# Patient Record
Sex: FEMALE | Race: WHITE | NOT HISPANIC OR LATINO | Employment: FULL TIME | URBAN - METROPOLITAN AREA
[De-identification: names, ages, dates, MRNs, and addresses within clinical notes are randomized per-mention and may not be internally consistent; named-entity substitution may affect disease eponyms.]

---

## 2017-07-25 ENCOUNTER — OFFICE VISIT (OUTPATIENT)
Dept: FAMILY MEDICINE | Facility: CLINIC | Age: 22
End: 2017-07-25
Payer: COMMERCIAL

## 2017-07-25 VITALS
HEIGHT: 68 IN | TEMPERATURE: 98 F | HEART RATE: 72 BPM | SYSTOLIC BLOOD PRESSURE: 134 MMHG | BODY MASS INDEX: 25.7 KG/M2 | OXYGEN SATURATION: 99 % | RESPIRATION RATE: 18 BRPM | DIASTOLIC BLOOD PRESSURE: 72 MMHG | WEIGHT: 169.56 LBS

## 2017-07-25 DIAGNOSIS — F41.0 PANIC: ICD-10-CM

## 2017-07-25 DIAGNOSIS — R19.7 DIARRHEA, UNSPECIFIED TYPE: ICD-10-CM

## 2017-07-25 DIAGNOSIS — R07.89 TIGHTNESS IN CHEST: ICD-10-CM

## 2017-07-25 DIAGNOSIS — F41.9 ANXIETY: Primary | ICD-10-CM

## 2017-07-25 DIAGNOSIS — N92.6 LATE MENSES: ICD-10-CM

## 2017-07-25 LAB
B-HCG UR QL: NEGATIVE
CTP QC/QA: YES

## 2017-07-25 PROCEDURE — 93010 ELECTROCARDIOGRAM REPORT: CPT | Mod: S$GLB,,, | Performed by: INTERNAL MEDICINE

## 2017-07-25 PROCEDURE — 99203 OFFICE O/P NEW LOW 30 MIN: CPT | Mod: S$GLB,,, | Performed by: FAMILY MEDICINE

## 2017-07-25 PROCEDURE — 81025 URINE PREGNANCY TEST: CPT | Mod: QW,S$GLB,, | Performed by: FAMILY MEDICINE

## 2017-07-25 PROCEDURE — 99999 PR PBB SHADOW E&M-EST. PATIENT-LVL IV: CPT | Mod: PBBFAC,,, | Performed by: FAMILY MEDICINE

## 2017-07-25 PROCEDURE — 93005 ELECTROCARDIOGRAM TRACING: CPT | Mod: S$GLB,,, | Performed by: FAMILY MEDICINE

## 2017-07-25 RX ORDER — SERTRALINE HYDROCHLORIDE 50 MG/1
50 TABLET, FILM COATED ORAL DAILY
Qty: 30 TABLET | Refills: 11 | Status: SHIPPED | OUTPATIENT
Start: 2017-07-25 | End: 2017-08-01

## 2017-07-25 NOTE — PROGRESS NOTES
Subjective:      Patient ID: Nadya Rosa is a 22 y.o. female.    Chief Complaint: Chest Pain      No past medical history on file.  No past surgical history on file.  No family history on file.  Social History     Social History    Marital status: Single     Spouse name: N/A    Number of children: N/A    Years of education: N/A     Occupational History    Not on file.     Social History Main Topics    Smoking status: Never Smoker    Smokeless tobacco: Never Used    Alcohol use Yes      Comment: rare    Drug use: Unknown    Sexual activity: Not on file     Other Topics Concern    Not on file     Social History Narrative    No narrative on file       Current Outpatient Prescriptions:     sertraline (ZOLOFT) 50 MG tablet, Take 1 tablet (50 mg total) by mouth once daily., Disp: 30 tablet, Rfl: 11  Review of patient's allergies indicates:  No Known Allergies    Review of Systems   Constitutional: Positive for activity change. Negative for unexpected weight change.   HENT: Negative for hearing loss, rhinorrhea and trouble swallowing.    Eyes: Negative for discharge and visual disturbance.   Respiratory: Positive for chest tightness. Negative for wheezing.    Cardiovascular: Positive for palpitations. Negative for chest pain.   Gastrointestinal: Positive for diarrhea. Negative for blood in stool, constipation and vomiting.   Endocrine: Negative for polydipsia and polyuria.   Genitourinary: Negative for difficulty urinating, dysuria, hematuria and menstrual problem.   Musculoskeletal: Negative for arthralgias, joint swelling and neck pain.   Neurological: Positive for headaches. Negative for weakness.   Psychiatric/Behavioral: Negative for confusion and dysphoric mood.     Chest Pain    Associated symptoms include headaches and palpitations. Pertinent negatives include no vomiting or weakness.     H/o anxiety for the past month.  Associated with intermittent headaches, chest tightness, tinglinig in hands  "and feet.  She is getting  in December.  Sister and her with strained relationship and sister moved back 3 months ago - trying to start a relationship.  Gatroenteritis last Monday and Tuesday resolved.  Sunday she was at movie with friends and felt horrible, bout of diarrhea and malaise.  Had to go home from movie theater.  She is becoming scared to be alone and having social withdrawal.  Chest tightness and sob comes and goes associated with stress and mild.  Headache resolved at this time.        Objective:   /72   Pulse 72   Temp 98.4 °F (36.9 °C) (Tympanic)   Resp 18   Ht 5' 7.5" (1.715 m)   Wt 76.9 kg (169 lb 8.5 oz)   LMP 06/22/2017   SpO2 99%   Breastfeeding? Unknown   BMI 26.16 kg/m²      Physical Exam   Constitutional: She is oriented to person, place, and time. She is cooperative. No distress.   Eyes: Conjunctivae and EOM are normal.   Cardiovascular: Normal rate, regular rhythm and normal heart sounds.    Pulmonary/Chest: Effort normal and breath sounds normal.   Musculoskeletal: She exhibits no edema.   Neurological: She is alert and oriented to person, place, and time. No cranial nerve deficit. Coordination and gait normal.   Skin: Skin is warm, dry and intact. No rash noted. She is not diaphoretic. No erythema.   Psychiatric: Her speech is normal and behavior is normal. Her mood appears anxious.   Nursing note and vitals reviewed.          Assessment:       1. Anxiety    2. Panic    3. Diarrhea, unspecified type    4. Tightness in chest            Plan:         Anxiety  Comments:  Start sertraline.  Side effects discussed.  Call if any problems.  Recheck in one month or sooner if any problems.  Orders:  -     CBC auto differential; Future; Expected date: 07/25/2017  -     Comprehensive metabolic panel; Future; Expected date: 07/25/2017  -     Vitamin B12; Future; Expected date: 07/25/2017  -     TSH; Future; Expected date: 07/25/2017  -     Magnesium; Future; Expected date: " 07/25/2017    Panic    Diarrhea, unspecified type  Comments:  Resolved.  Gastroenteritis Monday and Tuesday last week and diarrhea on Sunday.  Resolved at this time.    Tightness in chest  -     IN OFFICE EKG 12-LEAD (to [place])    Other orders  -     sertraline (ZOLOFT) 50 MG tablet; Take 1 tablet (50 mg total) by mouth once daily.  Dispense: 30 tablet; Refill: 11    UPT negative.    Patient Care Team:  Valerie Garza MD as PCP - General (Family Medicine)

## 2017-07-25 NOTE — PATIENT INSTRUCTIONS
Anxiety Reaction  Anxiety is the feeling we all get when we think something bad might happen. It is a normal response to stress and usually causes only a mild reaction. When anxiety becomes more severe, it can interfere with daily life. In some cases, you may not even be aware of what it is youre anxious about. There may also be a genetic link or it may be a learned behavior in the home.  Both psychological and physical triggers cause stress reaction. It's often a response to fear or emotional stress, real or imagined. This stress may come from home, family, work, or social relationships.  During an anxiety reaction, you may feel:  · Helpless  · Nervous  · Depressed  · Irritable  Your body may show signs of anxiety in many ways. You may experience:  · Dry mouth  · Shakiness  · Dizziness  · Weakness  · Trouble breathing  · Breathing fast (hyperventilating)  · Chest pressure  · Sweating  · Headache  · Nausea  · Diarrhea  · Tiredness  · Inability to sleep  · Sexual problems  Home care  · Try to locate the sources of stress in your life. They may not be obvious. These may include:  ¨ Daily hassles of life (traffic jams, missed appointments, car troubles, etc.)  ¨ Major life changes, both good (new baby, job promotion) and bad (loss of job, loss of loved one)  ¨ Overload: feeling that you have too many responsibilities and can't take care of all of them at once  ¨ Feeling helpless, feeling that your problems are beyond what youre able to solve  · Notice how your body reacts to stress. Learn to listen to your body signals. This will help you take action before the stress becomes severe.  · When you can, do something about the source of your stress. (Avoid hassles, limit the amount of change that happens in your life at one time and take a break when you feel overloaded).  · Unfortunately, many stressful situations can't be avoided. It is necessary to learn how to better manage stress. There are many proven methods  that will reduce your anxiety. These include simple things like exercise, good nutrition and adequate rest. Also, there are certain techniques that are helpful:  ¨ Relaxation  ¨ Breathing exercises  ¨ Visualization  ¨ Biofeedback  ¨ Meditation  For more information about this, consult your doctor or go to a local bookstore and review the many books and tapes available on this subject.  Follow-up care  If you feel that your anxiety is not responding to self-help measures, contact your doctor or make an appointment with a counselor. You may need short-term psychological counseling and temporary medicine to help you manage stress.  Call 911  Call your healthcare provider right away if any of these occur:  · Trouble breathing  · Confusion  · Drowsiness or trouble wakening  · Fainting or loss of consciousness  · Rapid heart rate  · Seizure  · New chest pain that becomes more severe, lasts longer, or spreads into your shoulder, arm, neck, jaw, or back  When to seek medical advice  Call your healthcare provider right away if any of these occur:  · Your symptoms get worse  · Severe headache not relieved by rest and mild pain reliever  Date Last Reviewed: 9/29/2015  © 7162-8421 [employer] [address] [place] [address]. All rights reserved. This information is not intended as a substitute for professional medical care. Always follow your healthcare professional's instructions.        Panic Attack  A panic attack is an extreme fear reaction that comes on for no clear reason. There is often a fear that something terrible will happen or that you may die. The attack may last a few minutes up to a few hours. Between attacks, things will seem quite normal. This condition has a psychological cause and can be treated with the help of a therapist or psychiatrist. Medicine can be very helpful for this problem.  Panic attacks usually come on suddenly, reaches a peak within minutes, and includes at least 4 of  these symptoms:  · Palpitations, pounding heart, or accelerated heart rate  · Sweating  · Crying  · Trembling or shaking  · Sensations of shortness of breath or smothering  · Feelings of choking  · Chest pain or discomfort  · Nausea or abdominal distress  · Feeling dizzy, unsteady, light-headed, or faint  · Numbness or tingling sensations  · Fear of dying  · Fear of going crazy or of losing control  · Feelings of unreality, strangeness, or detachment from the environment  Many of these symptoms can be linked to physical problems, so it is sometimes necessary to rule out conditions like thyroid disorders, heart disease, gastrointestinal problems, and others. They can also start as physical symptoms, but psychologically we may react to them in a fearful way, worsening the way we react and feel.  Home care  · Try to find the sources of stress in your life. They may not be obvious. These may include:  ¨ Daily hassles of life which pile up (traffic jams, missed appointments, car troubles, etc.).  ¨ Major life changes, both good (new baby, job promotion) and bad (loss of job, loss of loved one).  ¨ Feeling that you have too many responsibilities and can't take care of everything at once.  ¨ Helplessness: feeling like your problems are too much for you to handle.  · Notice how your body reacts to stress. Learn to listen to your body signals so that you can take action before the stress becomes severe.  · Try to be aware of what you were doing before the reaction started; this may give you clues to things that can trigger a reaction. It may be situations in your life, or what you were doing at the time.  · When possible, avoid or reduce the cause of stress. Avoid hassles, limit the amount of change that is happening in your life at one time or take a break when you feel overloaded.  · Unfortunately, many stressful situations cannot be avoided. Therefore, it is necessary to learn how to manage stress better. There are many  proven methods that work and will reduce your anxiety. These include simple things like exercise, good nutrition and adequate rest. Also, there are certain techniques that are helpful: relaxation and breathing exercises, visualization, biofeedback, meditation or simply taking some time-out to clear your mind. For more information about this, ask your doctor or go to a local bookstore and review the many books and tapes available on this subject.  Follow-up care  Follow-up with your healthcare provider, or as advised.  Call 911  Call 911 if any of these occur:  · Trouble breathing  · Confusion  · Very drowsy or trouble awakening  · Fainting or loss of consciousness  · Rapid heart rate  · Seizure  · New chest pain that becomes more severe, lasts longer, or spreads into your shoulder, arm, neck, jaw or back  When to seek medical advice  Call your healthcare provider right away if any of these occur:  · Worsening of your symptoms to the point of feeling out-of-control  · Increased pain with breathing  · Increasing feeling of weakness or dizziness  · Cough with dark colored sputum (phlegm) or blood  · Fever 1 degree above normal temperature ) lasting 24 to 48 hours or what your healthcare provider has advised  · Swelling, pain or redness in one leg  · Requests by family or friends for you to seek help for your symptoms  Date Last Reviewed: 9/29/2015 © 2000-2016 [employer]. [address] [place] [address], [place] [address]. All rights reserved. This information is not intended as a substitute for professional medical care. Always follow your healthcare professional's instructions.

## 2017-07-26 ENCOUNTER — TELEPHONE (OUTPATIENT)
Dept: FAMILY MEDICINE | Facility: CLINIC | Age: 22
End: 2017-07-26

## 2017-07-26 DIAGNOSIS — R19.7 DIARRHEA, UNSPECIFIED TYPE: ICD-10-CM

## 2017-07-26 NOTE — TELEPHONE ENCOUNTER
----- Message from Kira Castelan sent at 7/26/2017 10:12 AM CDT -----  Would like to speak to nurse about side effects from medication. Please call back at [phone]. thanks

## 2017-07-26 NOTE — TELEPHONE ENCOUNTER
Spoke to patient and patient states that she took the medication Zoloft around 10 pm and she had to leave work today around 10:30 am with nausea and diaherra. She would like to know what she should do. She is still feeling bad.

## 2017-07-27 ENCOUNTER — LAB VISIT (OUTPATIENT)
Dept: LAB | Facility: HOSPITAL | Age: 22
End: 2017-07-27
Attending: FAMILY MEDICINE
Payer: COMMERCIAL

## 2017-07-27 DIAGNOSIS — F41.9 ANXIETY: ICD-10-CM

## 2017-07-27 LAB
ALBUMIN SERPL BCP-MCNC: 4 G/DL
ALP SERPL-CCNC: 57 U/L
ALT SERPL W/O P-5'-P-CCNC: 14 U/L
ANION GAP SERPL CALC-SCNC: 9 MMOL/L
AST SERPL-CCNC: 13 U/L
BASOPHILS # BLD AUTO: 0.02 K/UL
BASOPHILS NFR BLD: 0.2 %
BILIRUB SERPL-MCNC: 0.5 MG/DL
BUN SERPL-MCNC: 11 MG/DL
CALCIUM SERPL-MCNC: 9.6 MG/DL
CHLORIDE SERPL-SCNC: 103 MMOL/L
CO2 SERPL-SCNC: 26 MMOL/L
CREAT SERPL-MCNC: 0.7 MG/DL
DIFFERENTIAL METHOD: ABNORMAL
EOSINOPHIL # BLD AUTO: 0.1 K/UL
EOSINOPHIL NFR BLD: 0.7 %
ERYTHROCYTE [DISTWIDTH] IN BLOOD BY AUTOMATED COUNT: 12.4 %
EST. GFR  (AFRICAN AMERICAN): >60 ML/MIN/1.73 M^2
EST. GFR  (NON AFRICAN AMERICAN): >60 ML/MIN/1.73 M^2
GLUCOSE SERPL-MCNC: 87 MG/DL
HCT VFR BLD AUTO: 40.8 %
HGB BLD-MCNC: 14.2 G/DL
LYMPHOCYTES # BLD AUTO: 1.9 K/UL
LYMPHOCYTES NFR BLD: 17.5 %
MAGNESIUM SERPL-MCNC: 2.2 MG/DL
MCH RBC QN AUTO: 30.7 PG
MCHC RBC AUTO-ENTMCNC: 34.8 G/DL
MCV RBC AUTO: 88 FL
MONOCYTES # BLD AUTO: 1 K/UL
MONOCYTES NFR BLD: 9.1 %
NEUTROPHILS # BLD AUTO: 7.9 K/UL
NEUTROPHILS NFR BLD: 72.4 %
PLATELET # BLD AUTO: 323 K/UL
PMV BLD AUTO: 10.2 FL
POTASSIUM SERPL-SCNC: 3.6 MMOL/L
PROT SERPL-MCNC: 7.4 G/DL
RBC # BLD AUTO: 4.63 M/UL
SODIUM SERPL-SCNC: 138 MMOL/L
TSH SERPL DL<=0.005 MIU/L-ACNC: 1.78 UIU/ML
WBC # BLD AUTO: 10.85 K/UL

## 2017-07-27 PROCEDURE — 80053 COMPREHEN METABOLIC PANEL: CPT

## 2017-07-27 PROCEDURE — 85025 COMPLETE CBC W/AUTO DIFF WBC: CPT

## 2017-07-27 PROCEDURE — 84443 ASSAY THYROID STIM HORMONE: CPT

## 2017-07-27 PROCEDURE — 82607 VITAMIN B-12: CPT

## 2017-07-27 PROCEDURE — 83735 ASSAY OF MAGNESIUM: CPT

## 2017-07-27 PROCEDURE — 36415 COLL VENOUS BLD VENIPUNCTURE: CPT | Mod: PO

## 2017-07-27 RX ORDER — ONDANSETRON HYDROCHLORIDE 8 MG/1
8 TABLET, FILM COATED ORAL EVERY 12 HOURS PRN
Qty: 14 TABLET | Refills: 0 | Status: SHIPPED | OUTPATIENT
Start: 2017-07-27 | End: 2017-08-01

## 2017-07-27 NOTE — TELEPHONE ENCOUNTER
Spoke to patient and she states that she is feeling about the same today. She states that she has not felt better since she took the first dose of the medication. She can't eat and she is feeling sick. She states that she still feels sick today. She does not wish to continue the medication because of the side effect she had on the first day. She states that she is missing work because of how she is feeling.

## 2017-07-28 ENCOUNTER — TELEPHONE (OUTPATIENT)
Dept: FAMILY MEDICINE | Facility: CLINIC | Age: 22
End: 2017-07-28

## 2017-07-28 LAB — VIT B12 SERPL-MCNC: 657 PG/ML

## 2017-07-28 NOTE — TELEPHONE ENCOUNTER
----- Message from Kira Castelan sent at 7/28/2017  1:23 PM CDT -----  Would like to know what medication she can take for a headache. Please call back at [phone]. thanks

## 2017-07-28 NOTE — TELEPHONE ENCOUNTER
Spoke to patient and patient states that she is having  Headaches with nausea medication, she would like to know what she can take. She says her pain is 7/10.

## 2017-07-28 NOTE — TELEPHONE ENCOUNTER
No diarrhea.  Nausea medication this morning.  Took zofran and has had headache - told she can take ibuprofen.  Discussed stopping sertraline, but she would like to continue and see if symptoms resolve.    RTC next week for recheck.  Please call and put her on the schedule next week.  Thanks.

## 2017-08-01 ENCOUNTER — OFFICE VISIT (OUTPATIENT)
Dept: FAMILY MEDICINE | Facility: CLINIC | Age: 22
End: 2017-08-01
Payer: COMMERCIAL

## 2017-08-01 VITALS
SYSTOLIC BLOOD PRESSURE: 122 MMHG | BODY MASS INDEX: 25.03 KG/M2 | DIASTOLIC BLOOD PRESSURE: 70 MMHG | WEIGHT: 165.13 LBS | HEIGHT: 68 IN | TEMPERATURE: 98 F | RESPIRATION RATE: 16 BRPM | OXYGEN SATURATION: 98 % | HEART RATE: 86 BPM

## 2017-08-01 DIAGNOSIS — R11.0 NAUSEA: ICD-10-CM

## 2017-08-01 DIAGNOSIS — R19.7 DIARRHEA, UNSPECIFIED TYPE: ICD-10-CM

## 2017-08-01 DIAGNOSIS — R51.9 HEADACHE, UNSPECIFIED HEADACHE TYPE: ICD-10-CM

## 2017-08-01 DIAGNOSIS — F41.9 ANXIETY: Primary | ICD-10-CM

## 2017-08-01 PROCEDURE — 99214 OFFICE O/P EST MOD 30 MIN: CPT | Mod: S$GLB,,, | Performed by: FAMILY MEDICINE

## 2017-08-01 PROCEDURE — 99999 PR PBB SHADOW E&M-EST. PATIENT-LVL III: CPT | Mod: PBBFAC,,, | Performed by: FAMILY MEDICINE

## 2017-08-01 RX ORDER — CLONAZEPAM 0.5 MG/1
0.5 TABLET ORAL DAILY PRN
Qty: 30 TABLET | Refills: 0 | Status: SHIPPED | OUTPATIENT
Start: 2017-08-01 | End: 2017-09-20 | Stop reason: SDUPTHER

## 2017-08-01 NOTE — PATIENT INSTRUCTIONS
Keys to Managing Stress  There are several keys to managing stress. First, learn to recognize when youre under stress and what triggers it. Next, find positive ways of responding to your triggers. Be sure to take good care of your health and make time to relax. Read on to learn more about the keys to managing stress.    Recognizing stress  Learn to recognize your stress and find out what triggers it. To do this, try to be aware of how you feel each day. If you notice your heart racing or your muscles tightening, your body may be responding to stress. Ask yourself why. Then write down your answer. To keep the process going, make a list of all the things that trigger stressful feelings.  Living a healthy life  Keeping yourself healthy helps you deal better with stress. This means getting enough sleep, eating right, and exercising. It also means knowing what you value most in life, and making time for yourself. Keep a daily health journal to see if youre doing these things. Then, read your journal each week. If you dont take good care of yourself, you may feel more stressed.  Responding better to stress  Life is full of stressors that you cant control. But you can learn more positive ways of responding to them. This will help you feel more in control. To begin, try this tip: Think about how much effort you want to put into dealing with a certain stressor. Do you really need to handle that stressor? If so, decide on the best way to do this. Change what you can. But if the stressor isnt important, or if its out of your control, then why worry about it?  Relaxing to slow down  Relaxing can help you prevent or relieve stressful feelings. This tip may also help: When youre facing a stressor, pause for a moment. Then take a deep breath and slowly breathe out as you count to 10. This will help clear your mind so you can respond to stress better.  Date Last Reviewed: 12/22/2014  © 7458-6578 [employer]  [address] [place] [id number]. All rights reserved. This information is not intended as a substitute for professional medical care. Always follow your healthcare professional's instructions.

## 2017-08-01 NOTE — PROGRESS NOTES
Subjective:      Patient ID: Nadya Rosa is a 22 y.o. female.    Chief Complaint: Follow-up      No past medical history on file.  No past surgical history on file.  No family history on file.  Social History     Social History    Marital status: Single     Spouse name: N/A    Number of children: N/A    Years of education: N/A     Occupational History    Not on file.     Social History Main Topics    Smoking status: Never Smoker    Smokeless tobacco: Never Used    Alcohol use Yes      Comment: rare    Drug use: Unknown    Sexual activity: Not on file     Other Topics Concern    Not on file     Social History Narrative    No narrative on file       Current Outpatient Prescriptions:     clonazePAM (KLONOPIN) 0.5 MG tablet, Take 1 tablet (0.5 mg total) by mouth daily as needed for Anxiety., Disp: 30 tablet, Rfl: 0  Review of patient's allergies indicates:  No Known Allergies    Review of Systems   Constitutional: Negative for activity change.   HENT: Negative for hearing loss, rhinorrhea and trouble swallowing.    Eyes: Negative for discharge and visual disturbance.   Respiratory: Negative for chest tightness and wheezing.    Cardiovascular: Negative for chest pain and palpitations.   Gastrointestinal: Negative for blood in stool, constipation, diarrhea and vomiting.   Endocrine: Negative for polydipsia and polyuria.   Genitourinary: Negative for difficulty urinating, dysuria, hematuria and menstrual problem.   Musculoskeletal: Negative for arthralgias and joint swelling.   Neurological: Negative for weakness and headaches.   Psychiatric/Behavioral: Negative for confusion, dysphoric mood and sleep disturbance. The patient is not nervous/anxious.      HPI  Had terrible reaction to zoloft.  Felt as if she was very ill - with nausea and headache.  Diarrhea resolved.  She stopped medication on Saturday.  Since that time, she has slept for the past few nights.  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  She  "has also talked to her sister since last visit.  Her mom's friend recommended that she try klonopin.  She denies any anxiety or insomnia at this time.  She has intermittent anxiety - occasional.  Just started around one month ago and she is not sure why except possibly planning her wedding.  Anxiety occasional.    Objective:   /70 (BP Location: Right arm, Patient Position: Sitting, BP Method: Manual)   Pulse 86   Temp 98.4 °F (36.9 °C) (Tympanic)   Resp 16   Ht 5' 7.5" (1.715 m)   Wt 74.9 kg (165 lb 2 oz)   LMP 06/22/2017   SpO2 98%   BMI 25.48 kg/m²      Physical Exam   Constitutional: She is oriented to person, place, and time. She is cooperative. No distress.   Eyes: Conjunctivae and EOM are normal.   Cardiovascular: Normal rate, regular rhythm and normal heart sounds.    Pulmonary/Chest: Effort normal and breath sounds normal.   Musculoskeletal: She exhibits no edema.   Neurological: She is alert and oriented to person, place, and time. No cranial nerve deficit. Coordination and gait normal.   Skin: Skin is warm, dry and intact. No rash noted. She is not diaphoretic. No erythema.   Psychiatric: She has a normal mood and affect. Her speech is normal and behavior is normal.   Nursing note and vitals reviewed.          Assessment:       1. Anxiety    2. Headache, unspecified headache type    3. Nausea    4. Diarrhea, unspecified type            Plan:         Anxiety  Comments:  Unable to tolerate zoloft.  Anxiety better - had talk with sister.  Cutting back on work hours.  Change to clonazepam prn - discussed se inc habit forming.  Orders:  -     clonazePAM (KLONOPIN) 0.5 MG tablet; Take 1 tablet (0.5 mg total) by mouth daily as needed for Anxiety.  Dispense: 30 tablet; Refill: 0    Headache, unspecified headache type  Comments:  Resolved after stopping zoloft.    Nausea  Comments:  Resolved after stopping zoloft.    Diarrhea, unspecified type  Comments:  Resolved at this time.  Started by viral " gastroenteritis.  Avoid dairy for 2 weeks.        Patient Care Team:  Valerie Garza MD as PCP - General (Family Medicine)

## 2017-08-16 ENCOUNTER — OFFICE VISIT (OUTPATIENT)
Dept: NEUROLOGY | Facility: CLINIC | Age: 22
End: 2017-08-16
Payer: COMMERCIAL

## 2017-08-16 VITALS
HEIGHT: 67 IN | SYSTOLIC BLOOD PRESSURE: 108 MMHG | BODY MASS INDEX: 26.37 KG/M2 | DIASTOLIC BLOOD PRESSURE: 70 MMHG | WEIGHT: 168 LBS | HEART RATE: 88 BPM

## 2017-08-16 DIAGNOSIS — F41.9 ANXIETY: ICD-10-CM

## 2017-08-16 PROCEDURE — 99999 PR PBB SHADOW E&M-EST. PATIENT-LVL III: CPT | Mod: PBBFAC,,, | Performed by: PSYCHIATRY & NEUROLOGY

## 2017-08-16 PROCEDURE — 3008F BODY MASS INDEX DOCD: CPT | Mod: S$GLB,,, | Performed by: PSYCHIATRY & NEUROLOGY

## 2017-08-16 PROCEDURE — 99204 OFFICE O/P NEW MOD 45 MIN: CPT | Mod: S$GLB,,, | Performed by: PSYCHIATRY & NEUROLOGY

## 2017-08-16 NOTE — PROGRESS NOTES
This is a 22-year-old right-handed patient who indicates that for the past month she has had an increase in her headache to occur on an almost daily basis.    The patient indicates that previously, she would only have a very random mild headache that was usually easily treated with simple medication.  In the last month however, her headaches have been occurring on a daily basis.  The patient states that she can awaken in the morning with pain or the pain starts in the morning.  On other days, the pain starts later in the day and then is present the remainder of the day.  The headache is not preceded by a particular aura although she does occasionally has some tingling sensation in her feet.    Headache pain has a gradual onset and is described as an aching steady pain with a level of 3-4 of 5/10.  The headache will then be present for several hours.  Location of the pain is usually across the forehead, in the back of the neck and occasionally behind the eyes.  When the pain is intense she may experience slight nausea but does not have any emesis.  She has not noticed severe photophobia or phonophobia although extremely bright lights will bother the headache.    To treat her headache, she takes ibuprofen 2 tablets just once in a day.  The ibuprofen is of variable benefit to her.  The patient has also been given a trial of Zoloft for panic.  Patient states that she had a panic attack that consisted of some chest pain with shortness of breath.  This had seem to occur on several occasions.  However when given Zoloft, she had severe side effects from the medication including prolonged nausea and then a feeling of overwhelming depression.  She has since discontinued that medication.  She has begun an active exercise program which she feels is of some benefit to her.    The patient denies any double vision or blurred vision.  She denies any change in speech.  She denies any difficulty with chewing or swallowing.  She is  not aware of any weakness, awkwardness, or clumsiness of the upper or lower extremities.  She denies any change in walking or standing balance.      ROS:  GENERAL: No fever, chills, fatigability or weight loss.  SKIN: No rashes, itching or changes in color or texture of skin.  HEAD: No headaches or recent head trauma.  EYES: Visual acuity fine. No photophobia, ocular pain or diplopia.  EARS: Denies ear pain, discharge or vertigo.  NOSE: No loss of smell, no epistaxis or postnasal drip.  MOUTH & THROAT: No hoarseness or change in voice. No excessive gum bleeding.  NODES: Denies swollen glands.  CHEST: Denies LAKE, cyanosis, wheezing, cough and sputum production.  CARDIOVASCULAR: Denies chest pain, PND, orthopnea or reduced exercise tolerance.  ABDOMEN: Appetite fine. No weight loss. Denies diarrhea, abdominal pain, hematemesis or blood in stool.  URINARY: No flank pain, dysuria or hematuria.  MUSCULOSKELETAL: No joint stiffness or swelling. Denies back pain.  NEUROLOGIC: No history of seizures, paralysis, alteration of gait or coordination.    PAST HISTORY:  Surgery: No surgical procedures  Medical: No chronic medical illnesses  ALLERGIES: NO KNOWN DRUG ALLERGIES    FAMILY HISTORY:  The patient states that her mother has occasional headache but not a recurrent headache issue.  She does not think there is anybody in the family that has known migraine headache.    SOCIAL HISTORY:  The patient is in a committed relationship and is engaged to be  in December, 2017.  She has been working full time and occasionally has been working 2 jobs.    PE:   VITAL SIGNS: Blood pressure 108/70, pulse 88, weight 76.2 kg, height 5 foot 7 inches, BMI 26.31  APPEARANCE: Well nourished, well developed, in no acute distress.    HEAD: Normocephalic, atraumatic.  No temporalis muscle tenderness.  EYES: PERRL. EOMI.  Non-icteric sclerae.    EARS: TM's intact. Light reflex normal. No retraction or perforation.    NOSE: Mucosa pink.  Airway clear.  MOUTH & THROAT: No tonsillar enlargement. No pharyngeal erythema or exudate. No stridor.  NECK: Supple. No bruits.  CHEST: Lungs clear to auscultation.  CARDIOVASCULAR: Regular rhythm without significant murmurs.  ABDOMEN: Bowel sounds normal. Not distended.   MUSCULOSKELETAL:  No bony deformity seen.    NEUROLOGIC:   Mental Status:  The patient is well oriented to person, time, place, and situation.  The patient is attentive to the environment and cooperative for the exam.  Cranial Nerves: II-XII grossly intact.  Visual acuity with near card unaided is 20/20 each eye and both eyes together.  The color red is perceived to be the same shade of red with each eye.  Visual fields are intact by confrontation.  Fundoscopic exam is normal.  No hemorrhage, exudate or papilledema is present. The extraocular muscles are intact in the cardinal directions of gaze.  No ptosis is present. Facial features are symmetrical.  Speech is normal in fluency, diction, and phrasing.  Tongue protrudes in the midline.    Gait and Station:  Romberg is negative.  Good alternate armswing with normal gait.  Motor:  No downdrift of either arm when held at shoulder level.  Manual muscle testing of proximal and distal muscles of both upper and lower extremities is normal. Muscle mass is normal.  Muscle tone is normal.  Sensory:  Intact both upper and lower extremities to pin prick, touch, and vibration.  Cerebellar:  Finger to nose done well.  Alternating movements intact.  No involuntary movements or tremor seen.  Reflexes:  Stretch reflexes are 2+ both upper and lower extremities.  Plantar stimulation is flexor bilaterally and no pathological reflexes are seen       ASSESSMENT:  1.  Tension type headache  2.  Generalized anxiety disorder    RECOMMENDATIONS:  1.  No medications are recommended for the patient's headache syndrome.  Instead, I would recommend a program of regular aerobic exercise at least 5 days a week.  In fact, this  routine should also be of some benefit in helping her with a generalized anxiety disorder.  2.  Return to neurology if other symptoms occur or if the headache syndrome changes his presentation.   This note is generated with speech recognition software and is subject to transcription error and sound alike phrases that may be missed by proofreading.

## 2017-08-22 ENCOUNTER — OFFICE VISIT (OUTPATIENT)
Dept: FAMILY MEDICINE | Facility: CLINIC | Age: 22
End: 2017-08-22
Payer: COMMERCIAL

## 2017-08-22 VITALS
WEIGHT: 169.06 LBS | BODY MASS INDEX: 26.53 KG/M2 | RESPIRATION RATE: 16 BRPM | TEMPERATURE: 98 F | DIASTOLIC BLOOD PRESSURE: 70 MMHG | HEART RATE: 77 BPM | HEIGHT: 67 IN | OXYGEN SATURATION: 99 % | SYSTOLIC BLOOD PRESSURE: 110 MMHG

## 2017-08-22 DIAGNOSIS — F41.9 ANXIETY: Primary | ICD-10-CM

## 2017-08-22 DIAGNOSIS — R51.9 HEADACHE, UNSPECIFIED HEADACHE TYPE: ICD-10-CM

## 2017-08-22 PROCEDURE — 99213 OFFICE O/P EST LOW 20 MIN: CPT | Mod: S$GLB,,, | Performed by: FAMILY MEDICINE

## 2017-08-22 PROCEDURE — 99999 PR PBB SHADOW E&M-EST. PATIENT-LVL III: CPT | Mod: PBBFAC,,, | Performed by: FAMILY MEDICINE

## 2017-08-22 PROCEDURE — 3008F BODY MASS INDEX DOCD: CPT | Mod: S$GLB,,, | Performed by: FAMILY MEDICINE

## 2017-08-22 NOTE — PATIENT INSTRUCTIONS
Progressive Relaxation  Your body needs relaxation to reduce stress and undo the fight-or-flight response. It helps to plan for 20 minutes of relaxation every day. This is time for yourself. Sit or lie comfortably. Limit distractions like phones. Listen to soft music or just sit in silence. And try the relaxation technique below.    How to do progressive relaxation  Progressive relaxation helps your whole body relax. To try this technique, follow these steps:  1. Find a quiet room. Sit in a comfortable chair or lie on your back.  2. Breathe in deeply to a slow count of 5. Feel your belly, chest, and back expand. Breathe out slowly to a count of 5.  3. After a few minutes, breathe in deeply. Tighten the muscles in your feet. Notice how it feels. Hold the tension for 3 seconds.  4. Breathe out while relaxing the tightened muscles. Notice how relaxed you feel.  5. Repeat steps 3 and 4 with another muscle group. You can move from your feet, calves, and thighs to your stomach, arms, and hands.  Remember the 4 As  · Avoid a stressor. If someone is smoking when youre trying to quit, leave the room.  · Accept a stressor you cant change, like a job loss, by knowing that your feelings are normal.  · Alter how you deal with a stressor. If a constantly ringing phone is a stressor, let the answering machine .  · Adapt to some stressors. When starting a new exercise program, instead of focusing on how hard it will be, think how good you will feel.  Date Last Reviewed: 2/25/2016  © 1965-3500 [employer]. [address] [place] [address], [place] [address]. All rights reserved. This information is not intended as a substitute for professional medical care. Always follow your healthcare professional's instructions.        Stress Relief: Relaxation  Focusing the mind helps provide stress relief. Taking 5 to 10 minutes to practice relaxation each day helps you feel more refreshed. The following exercises can be done  "almost anywhere. Try one or more until you find what works best for you.  Calm your mind    Find a quiet place where you won't be disturbed. Then try the following:  · Sit comfortably. Take off your shoes. Turn off your cell phone and pager. Take a few deep breaths.  · Focus your mind on one peaceful thought, image, or word. Then try to hold that thought for 5 minutes.  · When other thoughts enter your mind, relax and refocus. Let the invading thoughts fall away.  · When you're done, stand up slowly and stretch your arms over your head. With practice, this exercise can help you feel restored.     Calm your body  With practice, you can use mental cues to tell your body how to feel.  · Sit comfortably and clear your mind. A few deep breaths will help.  · Mentally focus on your left hand and repeat to yourself, "My left hand feels warm and heavy." Keep doing this until your hand does feel heavier and warmer.  · Repeat the exercise using your right hand. Then focus on your arms, legs, and feet until your whole body feels relaxed.  · When you're done, stand up slowly and stretch your arms overhead.     Visualization  Visualization is like taking a mental vacation. It frees your mind while keeping your body in a calm state. To get started, picture yourself feeling warm and relaxed. Choose a peaceful setting that appeals to you and fill in the details. If you imagine a tropical beach, listen to the waves on the shore. Feel the sun on your face. Dig your toes in the sand. By using the power of your mind, you can take a soothing break when you need to.  Date Last Reviewed: 12/22/2014  © 8043-4614 [employer]. [address] [place] [address], [place] [address]. All rights reserved. This information is not intended as a substitute for professional medical care. Always follow your healthcare professional's instructions.        "

## 2017-08-22 NOTE — PROGRESS NOTES
Subjective:      Patient ID: Nadya Rosa is a 22 y.o. female.    Chief Complaint: Follow-up      Past Medical History:   Diagnosis Date    Headache      No past surgical history on file.  No family history on file.  Social History     Social History    Marital status: Single     Spouse name: N/A    Number of children: N/A    Years of education: N/A     Occupational History    Not on file.     Social History Main Topics    Smoking status: Never Smoker    Smokeless tobacco: Never Used    Alcohol use Yes      Comment: rare    Drug use: No    Sexual activity: Yes     Partners: Male     Other Topics Concern    Not on file     Social History Narrative    No narrative on file       Current Outpatient Prescriptions:     clonazePAM (KLONOPIN) 0.5 MG tablet, Take 1 tablet (0.5 mg total) by mouth daily as needed for Anxiety., Disp: 30 tablet, Rfl: 0  Review of patient's allergies indicates:  No Known Allergies    Review of Systems   Constitutional: Negative for activity change and unexpected weight change.   HENT: Negative for hearing loss, rhinorrhea and trouble swallowing.    Eyes: Negative for discharge and visual disturbance.   Respiratory: Negative for chest tightness and wheezing.    Cardiovascular: Negative for chest pain and palpitations.   Gastrointestinal: Negative for blood in stool, constipation, diarrhea and vomiting.   Endocrine: Negative for polydipsia and polyuria.   Genitourinary: Negative for difficulty urinating, dysuria, hematuria and menstrual problem.   Musculoskeletal: Negative for arthralgias, joint swelling and neck pain.   Neurological: Negative for weakness and headaches.   Psychiatric/Behavioral: Negative for confusion and dysphoric mood.     HPI    Anxiety much better.  Symptoms have subsided. She had terrible reaction to sertraline - side effects derealization, headaches, diarrhea, fatigue, malaise.  Took for 3 days and felt terrible.  Took around 2 weeks for symptoms to  "subside.  She is now exercising, trying to eat healthy and feeling better.  Clonazepam twice weekly as needed.  She has cut back at work, worked on relationship with sister.    Objective:   /70   Pulse 77   Temp 97.9 °F (36.6 °C)   Resp 16   Ht 5' 7" (1.702 m)   Wt 76.7 kg (169 lb 1.5 oz)   LMP 07/28/2017   SpO2 99%   BMI 26.48 kg/m²      Physical Exam   Constitutional: She is oriented to person, place, and time. She is cooperative. No distress.   Eyes: Conjunctivae and EOM are normal.   Cardiovascular: Normal rate, regular rhythm and normal heart sounds.    Pulmonary/Chest: Effort normal and breath sounds normal.   Musculoskeletal: She exhibits no edema.   Neurological: She is alert and oriented to person, place, and time. Coordination and gait normal.   Skin: Skin is warm, dry and intact. No rash noted. She is not diaphoretic. No erythema.   Psychiatric: She has a normal mood and affect. Her speech is normal and behavior is normal.   Nursing note and vitals reviewed.          Assessment:       1. Anxiety    2. Headache, unspecified headache type            Plan:         Anxiety  Comments:  Anxiety better.  Exercising regularly.  Cut back at work.  Symptoms have subsided.  Clonazepam prn twice weekly.    Headache, unspecified headache type  Comments:  Resolved.  Feeling much better.        Patient Care Team:  Valerie Garza MD as PCP - General (Family Medicine)    Return if symptoms worsen or fail to improve.  "

## 2017-09-11 ENCOUNTER — PATIENT MESSAGE (OUTPATIENT)
Dept: NEUROLOGY | Facility: CLINIC | Age: 22
End: 2017-09-11

## 2017-09-19 ENCOUNTER — PATIENT MESSAGE (OUTPATIENT)
Dept: NEUROLOGY | Facility: CLINIC | Age: 22
End: 2017-09-19

## 2017-09-20 DIAGNOSIS — F41.9 ANXIETY: ICD-10-CM

## 2017-09-20 RX ORDER — CLONAZEPAM 0.5 MG/1
0.5 TABLET ORAL DAILY PRN
Qty: 30 TABLET | Refills: 0 | Status: SHIPPED | OUTPATIENT
Start: 2017-09-20 | End: 2017-10-25 | Stop reason: SDUPTHER

## 2017-09-20 NOTE — TELEPHONE ENCOUNTER
----- Message from Laury Poreaj sent at 9/20/2017 12:24 PM CDT -----  Contact: patient  1. What is the name of the medication you are requesting? Clonazapam  2. What is the dose? 0.5 mg  3. How do you take the medication? Orally, topically, etc? oral  4. How often do you take this medication? daily  5. Do you need a 30 day or 90 day supply? 30 day  6. How many refills are you requesting? 1  7. What is your preferred pharmacy and location of the pharmacy?   [facility] [address] [place] [address] [address] [facility] AT [facility]  [address] [employer] [facility] [address]  Phone: [phone] Fax: [phone]      8. Who can we contact with further questions? Patient @ [phone]    Please call patient before calling in RX refill. Thanks, zafar

## 2017-09-21 ENCOUNTER — OFFICE VISIT (OUTPATIENT)
Dept: FAMILY MEDICINE | Facility: CLINIC | Age: 22
End: 2017-09-21
Payer: COMMERCIAL

## 2017-09-21 VITALS
BODY MASS INDEX: 25.92 KG/M2 | WEIGHT: 165.13 LBS | RESPIRATION RATE: 16 BRPM | SYSTOLIC BLOOD PRESSURE: 110 MMHG | DIASTOLIC BLOOD PRESSURE: 78 MMHG | HEART RATE: 83 BPM | TEMPERATURE: 97 F | HEIGHT: 67 IN | OXYGEN SATURATION: 99 %

## 2017-09-21 DIAGNOSIS — F41.9 ANXIETY: Chronic | ICD-10-CM

## 2017-09-21 DIAGNOSIS — R10.13 ACUTE EPIGASTRIC PAIN: ICD-10-CM

## 2017-09-21 DIAGNOSIS — J06.9 UPPER RESPIRATORY TRACT INFECTION, UNSPECIFIED TYPE: Primary | ICD-10-CM

## 2017-09-21 PROCEDURE — 99999 PR PBB SHADOW E&M-EST. PATIENT-LVL III: CPT | Mod: PBBFAC,,, | Performed by: FAMILY MEDICINE

## 2017-09-21 PROCEDURE — 3008F BODY MASS INDEX DOCD: CPT | Mod: S$GLB,,, | Performed by: FAMILY MEDICINE

## 2017-09-21 PROCEDURE — 99214 OFFICE O/P EST MOD 30 MIN: CPT | Mod: S$GLB,,, | Performed by: FAMILY MEDICINE

## 2017-09-21 NOTE — LETTER
September 21, 2017      [facility]  [address] [facility] [address] [facility] [facility] [address]  Phone: [phone]       Patient: Nadya Rosa   YOB: 1995  Date of Visit: 09/21/2017    To Whom It May Concern:    Jamar Rosa  was at [facility] on 09/21/2017. She may return to work/school on 9/22/2017 with no restrictions. If you have any questions or concerns, or if I can be of further assistance, please do not hesitate to contact me.    Sincerely,    Norma Garza MD

## 2017-09-21 NOTE — PROGRESS NOTES
Subjective:      Patient ID: Nadya Rosa is a 22 y.o. female.    Chief Complaint: Bloated      Past Medical History:   Diagnosis Date    Anxiety 9/21/2017    Headache      No past surgical history on file.  No family history on file.  Social History     Social History    Marital status: Single     Spouse name: N/A    Number of children: N/A    Years of education: N/A     Occupational History    Not on file.     Social History Main Topics    Smoking status: Never Smoker    Smokeless tobacco: Never Used    Alcohol use Yes      Comment: rare    Drug use: No    Sexual activity: Yes     Partners: Male     Other Topics Concern    Not on file     Social History Narrative    No narrative on file       Current Outpatient Prescriptions:     clonazePAM (KLONOPIN) 0.5 MG tablet, Take 1 tablet (0.5 mg total) by mouth daily as needed for Anxiety., Disp: 30 tablet, Rfl: 0  Review of patient's allergies indicates:   Allergen Reactions    Sertraline Anxiety     Worsening anxiety, depression, fatigue, malaise.       Review of Systems   Constitutional: Positive for fatigue. Negative for chills and fever.   HENT: Positive for congestion. Negative for ear pain and sore throat.    Respiratory: Positive for cough. Negative for shortness of breath and wheezing.    Cardiovascular: Negative for chest pain and palpitations.   Gastrointestinal: Positive for abdominal distention. Negative for blood in stool, constipation, diarrhea and nausea.   Psychiatric/Behavioral: The patient is nervous/anxious.      HPI  Anxiety has been persistent for months.  Worsened again during August.  She has occasional panic attacks. Difficulty sleeping at night sometimes - wakes for around 15-45 minutes at 2 or 3 am.  She is under stress about changing jobs and getting .  Sister in the hospital as well.  Here today also b/c she has had one week of cough and some upper respiratory symptoms.  Exposed to niece and nephew with similar  "symptoms.  She had a bloating and burning pain in her epigastrium last night that eased with heating pad and zantac. Only medication she had been taking prior was menthol cough drops all day.    She has also felt tired this week and not much strength for exercise.    Objective:   /78 (BP Location: Right arm, Patient Position: Sitting, BP Method: Small (Manual))   Pulse 83   Temp 97.1 °F (36.2 °C) (Tympanic)   Resp 16   Ht 5' 7" (1.702 m)   Wt 74.9 kg (165 lb 2 oz)   LMP 09/01/2017 (Exact Date)   SpO2 99%   BMI 25.86 kg/m²      Physical Exam   Constitutional: She is oriented to person, place, and time. She is cooperative. No distress.   Eyes: Conjunctivae and EOM are normal.   Cardiovascular: Normal rate, regular rhythm and normal heart sounds.    Pulmonary/Chest: Effort normal and breath sounds normal.   Musculoskeletal: She exhibits no edema.   Neurological: She is alert and oriented to person, place, and time. Coordination and gait normal.   Skin: Skin is warm, dry and intact. No rash noted. She is not diaphoretic. No erythema.   Psychiatric: She has a normal mood and affect. Her speech is normal and behavior is normal.   Nursing note and vitals reviewed.          Assessment:       1. Upper respiratory tract infection, unspecified type    2. Acute epigastric pain    3. Anxiety            Plan:         Upper respiratory tract infection, unspecified type  Comments:  Self limited.  Call if not resolved in one week or if worsening.    Acute epigastric pain  Comments:  Likely mild gastritis/esophagitis from menthol cough drops.  Took zantac last night.  Call if persistent.    Anxiety  Comments:  Anxiety kicking up a bit - ready to change jobs, getting , sister in hospital.  Continue clonazepam at current dose.  Add meditation at night.  Handout.        Patient Care Team:  Valerie Garza MD as PCP - General (Family Medicine)    Return in about 6 weeks (around 11/2/2017) for review meds.  "

## 2017-10-18 ENCOUNTER — PATIENT MESSAGE (OUTPATIENT)
Dept: ADMINISTRATIVE | Facility: HOSPITAL | Age: 22
End: 2017-10-18

## 2017-10-18 ENCOUNTER — PATIENT OUTREACH (OUTPATIENT)
Dept: ADMINISTRATIVE | Facility: HOSPITAL | Age: 22
End: 2017-10-18

## 2017-10-25 DIAGNOSIS — F41.9 ANXIETY: ICD-10-CM

## 2017-10-25 NOTE — TELEPHONE ENCOUNTER
----- Message from Aracelisaj Wakefield sent at 10/25/2017  3:38 PM CDT -----  Contact: Pt  Pt called and stated she needed to speak to the nurse. She stated she needs a refill:      1. What is the name of the medication you are requesting? Clonazepam  2. What is the dose? 0.5 mg   3. How do you take the medication? Orally, topically, etc? Orally   4. How often do you take this medication? 1 tab every night   5. Do you need a 30 day or 90 day supply? 30 day   6. How many refills are you requesting?   7. What is your preferred pharmacy and location of the pharmacy?     [facility] [address] [place] [address]  Phone: [phone] Fax: [phone]      8. Who can we contact with further questions? She can be reached at [phone] (home)     SMITH Agustin

## 2017-10-26 RX ORDER — CLONAZEPAM 0.5 MG/1
0.5 TABLET ORAL DAILY PRN
Qty: 30 TABLET | Refills: 0 | Status: SHIPPED | OUTPATIENT
Start: 2017-10-26 | End: 2017-11-28 | Stop reason: SDUPTHER

## 2017-11-28 DIAGNOSIS — F41.9 ANXIETY: ICD-10-CM

## 2017-11-28 RX ORDER — CLONAZEPAM 0.5 MG/1
0.5 TABLET ORAL DAILY PRN
Qty: 30 TABLET | Refills: 0 | Status: SHIPPED | OUTPATIENT
Start: 2017-11-28 | End: 2018-01-02 | Stop reason: SDUPTHER

## 2017-11-28 NOTE — TELEPHONE ENCOUNTER
----- Message from Elyse Myles sent at 11/28/2017  2:04 PM CST -----  Contact: self  Pt is calling in regards to finding out if her prescription(clonazePAM (KLONOPIN) 0.5 MG tablet )  Was called in.    Pt can be reached at [phone].    Thank you

## 2017-11-28 NOTE — TELEPHONE ENCOUNTER
----- Message from Elyse Myles sent at 11/28/2017  2:04 PM CST -----  Contact: self  Pt is calling in regards to finding out if her prescription(clonazePAM (KLONOPIN) 0.5 MG tablet )  Was called in.    Pt can be reached at [phone].    Thank you

## 2017-11-28 NOTE — TELEPHONE ENCOUNTER
----- Message from Kimberly Pollack sent at 11/27/2017  6:43 PM CST -----  Contact: PT  PT calling in needing refill:     clonazePAM (KLONOPIN) 0.5 MG tablet    Send to:   [id number]   [address]Z  [address] AT [address]   [phone] (Phone)  [phone] (Fax)        Callback: [phone]

## 2017-12-22 ENCOUNTER — PATIENT MESSAGE (OUTPATIENT)
Dept: NEUROLOGY | Facility: CLINIC | Age: 22
End: 2017-12-22

## 2018-01-02 DIAGNOSIS — F41.9 ANXIETY: ICD-10-CM

## 2018-01-02 RX ORDER — CLONAZEPAM 0.5 MG/1
0.25 TABLET ORAL DAILY PRN
Qty: 20 TABLET | Refills: 0 | Status: SHIPPED | OUTPATIENT
Start: 2018-01-02 | End: 2018-01-19 | Stop reason: SDUPTHER

## 2018-01-02 NOTE — TELEPHONE ENCOUNTER
----- Message from Patt Ornelas sent at 1/2/2018 12:17 PM CST -----  Contact: self [phone]  1. What is the name of the medication you are requesting? clonazepam  2. What is the dose? 0.5mg  3. How do you take the medication? Orally, topically, etc? orally  4. How often do you take this medication? daily  5. Do you need a 30 day or 90 day supply? 30 day  6. How many refills are you requesting? 1  7. What is your preferred pharmacy and location of the pharmacy?     [facility] [address] [place] [address] [address] [facility] AT [facility]  [address] [facility] [address]  Phone: [phone] Fax: [phone]    8. Who can we contact with further questions? Pt [phone]

## 2018-01-02 NOTE — TELEPHONE ENCOUNTER
Spoke to patient and she states that she is in between jobs and she has not been able to schedule an appointment due to change in insurance. Patient states that she message Dr. Horne to see about weaning her off and she did not have any medication to be weaned off. So that is why she is requesting a refill to be weaned off. She tried to stop cold turkey and had really bad headaches.

## 2018-01-02 NOTE — TELEPHONE ENCOUNTER
Patient last seen in September - needs to be seen every 3 months to discuss this medication; also, looks like she discussed with Dr. Horne to stop this medication.

## 2018-01-03 ENCOUNTER — PATIENT MESSAGE (OUTPATIENT)
Dept: FAMILY MEDICINE | Facility: CLINIC | Age: 23
End: 2018-01-03

## 2018-01-03 NOTE — TELEPHONE ENCOUNTER
This is a type of medication that requires a visit every 3 months to discuss and reevaluate.  She needs to make appointment to discuss since it is a controlled substance.

## 2018-01-19 ENCOUNTER — OFFICE VISIT (OUTPATIENT)
Dept: FAMILY MEDICINE | Facility: CLINIC | Age: 23
End: 2018-01-19
Payer: COMMERCIAL

## 2018-01-19 VITALS
HEART RATE: 68 BPM | TEMPERATURE: 98 F | HEIGHT: 67 IN | DIASTOLIC BLOOD PRESSURE: 77 MMHG | RESPIRATION RATE: 16 BRPM | WEIGHT: 170.19 LBS | OXYGEN SATURATION: 97 % | BODY MASS INDEX: 26.71 KG/M2 | SYSTOLIC BLOOD PRESSURE: 110 MMHG

## 2018-01-19 DIAGNOSIS — F41.9 ANXIETY: Primary | ICD-10-CM

## 2018-01-19 PROCEDURE — 99999 PR PBB SHADOW E&M-EST. PATIENT-LVL III: CPT | Mod: PBBFAC,,, | Performed by: FAMILY MEDICINE

## 2018-01-19 PROCEDURE — 99213 OFFICE O/P EST LOW 20 MIN: CPT | Mod: S$GLB,,, | Performed by: FAMILY MEDICINE

## 2018-01-19 RX ORDER — CLONAZEPAM 0.5 MG/1
0.5 TABLET ORAL NIGHTLY
Qty: 30 TABLET | Refills: 3 | Status: SHIPPED | OUTPATIENT
Start: 2018-01-19 | End: 2018-05-16

## 2018-01-19 NOTE — PROGRESS NOTES
Subjective:      Patient ID: Nadya Rosa is a 22 y.o. female.    Chief Complaint: Medication Review      Past Medical History:   Diagnosis Date    Anxiety 9/21/2017    Headache      No past surgical history on file.  No family history on file.  Social History     Social History    Marital status: Single     Spouse name: N/A    Number of children: N/A    Years of education: N/A     Occupational History    Not on file.     Social History Main Topics    Smoking status: Never Smoker    Smokeless tobacco: Never Used    Alcohol use Yes      Comment: rare    Drug use: No    Sexual activity: Yes     Partners: Male     Other Topics Concern    Not on file     Social History Narrative    No narrative on file       Current Outpatient Prescriptions:     clonazePAM (KLONOPIN) 0.5 MG tablet, Take 1 tablet (0.5 mg total) by mouth every evening., Disp: 30 tablet, Rfl: 3  Review of patient's allergies indicates:   Allergen Reactions    Sertraline Anxiety     Worsening anxiety, depression, fatigue, malaise.       Review of Systems   Constitutional: Negative for activity change and unexpected weight change.   HENT: Negative for hearing loss, rhinorrhea and trouble swallowing.    Eyes: Negative for discharge and visual disturbance.   Respiratory: Negative for chest tightness and wheezing.    Cardiovascular: Negative for chest pain and palpitations.   Gastrointestinal: Negative for blood in stool, constipation, diarrhea and vomiting.   Endocrine: Negative for polydipsia and polyuria.   Genitourinary: Negative for difficulty urinating, dysuria, hematuria and menstrual problem.   Musculoskeletal: Negative for arthralgias, joint swelling and neck pain.   Neurological: Negative for weakness and headaches.   Psychiatric/Behavioral: Negative for confusion and dysphoric mood. The patient is nervous/anxious.      HPI  Here today to discuss medication.  She has had anxiety over the past year related to her sister and  "wedding.  These have improved, but then she lost her job.  She is working at [employer] and has a job opportunity for the line of work she would prefer upcoming.  She still has a great deal of anxiety and takes clonazepam 0.5 mg at night which calms her down.  She states that she meditates at night and gets a lot of exercise at her job.    Objective:   /77 (BP Location: Right arm, Patient Position: Sitting, BP Method: Small (Manual))   Pulse 68   Temp 97.6 °F (36.4 °C) (Tympanic)   Resp 16   Ht 5' 7" (1.702 m)   Wt 77.2 kg (170 lb 3.1 oz)   LMP 01/19/2018   SpO2 97%   BMI 26.66 kg/m²      Physical Exam   Constitutional: She is oriented to person, place, and time. She is cooperative. No distress.   Eyes: Conjunctivae and EOM are normal.   Cardiovascular: Normal rate, regular rhythm and normal heart sounds.    Pulmonary/Chest: Effort normal and breath sounds normal.   Musculoskeletal: She exhibits no edema.   Neurological: She is alert and oriented to person, place, and time. Coordination and gait normal.   Skin: Skin is warm, dry and intact. No rash noted. She is not diaphoretic. No erythema.   Psychiatric: She has a normal mood and affect. Her speech is normal and behavior is normal.   Nursing note and vitals reviewed.          Assessment:       1. Anxiety            Plan:         Anxiety  Comments:  Unable to tolerate zoloft. Severe reaction to zoloft.  Continue 0.5 mg clonazepam at night.  She tried without and stress worsened.  Continue at this time.   Orders:  -     clonazePAM (KLONOPIN) 0.5 MG tablet; Take 1 tablet (0.5 mg total) by mouth every evening.  Dispense: 30 tablet; Refill: 3        Patient Care Team:  Valerie Garza MD as PCP - General (Family Medicine)    Follow-up in about 3 months (around 4/19/2018).  "

## 2018-05-14 ENCOUNTER — LAB VISIT (OUTPATIENT)
Dept: LAB | Facility: HOSPITAL | Age: 23
End: 2018-05-14
Attending: OBSTETRICS & GYNECOLOGY
Payer: COMMERCIAL

## 2018-05-14 ENCOUNTER — OFFICE VISIT (OUTPATIENT)
Dept: OBSTETRICS AND GYNECOLOGY | Facility: CLINIC | Age: 23
End: 2018-05-14
Payer: COMMERCIAL

## 2018-05-14 ENCOUNTER — PATIENT MESSAGE (OUTPATIENT)
Dept: OBSTETRICS AND GYNECOLOGY | Facility: CLINIC | Age: 23
End: 2018-05-14

## 2018-05-14 VITALS
SYSTOLIC BLOOD PRESSURE: 102 MMHG | WEIGHT: 174.19 LBS | DIASTOLIC BLOOD PRESSURE: 60 MMHG | BODY MASS INDEX: 27.28 KG/M2

## 2018-05-14 DIAGNOSIS — N92.0 MENORRHAGIA WITH REGULAR CYCLE: ICD-10-CM

## 2018-05-14 DIAGNOSIS — N94.3 PMS (PREMENSTRUAL SYNDROME): Primary | ICD-10-CM

## 2018-05-14 LAB
BASOPHILS # BLD AUTO: 0.02 K/UL
BASOPHILS NFR BLD: 0.3 %
DIFFERENTIAL METHOD: NORMAL
EOSINOPHIL # BLD AUTO: 0.1 K/UL
EOSINOPHIL NFR BLD: 1.2 %
ERYTHROCYTE [DISTWIDTH] IN BLOOD BY AUTOMATED COUNT: 12.6 %
HCT VFR BLD AUTO: 42.7 %
HGB BLD-MCNC: 14.9 G/DL
LYMPHOCYTES # BLD AUTO: 1.5 K/UL
LYMPHOCYTES NFR BLD: 20.9 %
MCH RBC QN AUTO: 30.3 PG
MCHC RBC AUTO-ENTMCNC: 34.9 G/DL
MCV RBC AUTO: 87 FL
MONOCYTES # BLD AUTO: 0.5 K/UL
MONOCYTES NFR BLD: 7.1 %
NEUTROPHILS # BLD AUTO: 5.1 K/UL
NEUTROPHILS NFR BLD: 70.4 %
PLATELET # BLD AUTO: 251 K/UL
PMV BLD AUTO: 9.9 FL
RBC # BLD AUTO: 4.92 M/UL
WBC # BLD AUTO: 7.29 K/UL

## 2018-05-14 PROCEDURE — 85025 COMPLETE CBC W/AUTO DIFF WBC: CPT | Mod: PO

## 2018-05-14 PROCEDURE — 99204 OFFICE O/P NEW MOD 45 MIN: CPT | Mod: S$GLB,,, | Performed by: OBSTETRICS & GYNECOLOGY

## 2018-05-14 PROCEDURE — 3008F BODY MASS INDEX DOCD: CPT | Mod: CPTII,S$GLB,, | Performed by: OBSTETRICS & GYNECOLOGY

## 2018-05-14 PROCEDURE — 36415 COLL VENOUS BLD VENIPUNCTURE: CPT | Mod: PO

## 2018-05-14 PROCEDURE — 99999 PR PBB SHADOW E&M-EST. PATIENT-LVL II: CPT | Mod: PBBFAC,,, | Performed by: OBSTETRICS & GYNECOLOGY

## 2018-05-16 ENCOUNTER — HOSPITAL ENCOUNTER (EMERGENCY)
Facility: HOSPITAL | Age: 23
Discharge: HOME | End: 2018-05-16
Attending: EMERGENCY MEDICINE
Payer: COMMERCIAL

## 2018-05-16 VITALS
HEART RATE: 76 BPM | OXYGEN SATURATION: 99 % | TEMPERATURE: 98 F | SYSTOLIC BLOOD PRESSURE: 112 MMHG | WEIGHT: 171 LBS | DIASTOLIC BLOOD PRESSURE: 78 MMHG | HEIGHT: 67 IN | RESPIRATION RATE: 18 BRPM | BODY MASS INDEX: 26.84 KG/M2

## 2018-05-16 DIAGNOSIS — R51.9 NONINTRACTABLE HEADACHE, UNSPECIFIED CHRONICITY PATTERN, UNSPECIFIED HEADACHE TYPE: Primary | ICD-10-CM

## 2018-05-16 PROCEDURE — 99283 EMERGENCY DEPT VISIT LOW MDM: CPT | Mod: 25

## 2018-05-16 PROCEDURE — 63600175 PHARM REV CODE 636 W HCPCS: Performed by: EMERGENCY MEDICINE

## 2018-05-16 PROCEDURE — 96372 THER/PROPH/DIAG INJ SC/IM: CPT

## 2018-05-16 RX ORDER — PROMETHAZINE HYDROCHLORIDE 25 MG/ML
25 INJECTION, SOLUTION INTRAMUSCULAR; INTRAVENOUS
Status: COMPLETED | OUTPATIENT
Start: 2018-05-16 | End: 2018-05-16

## 2018-05-16 RX ORDER — KETOROLAC TROMETHAMINE 30 MG/ML
30 INJECTION, SOLUTION INTRAMUSCULAR; INTRAVENOUS
Status: DISCONTINUED | OUTPATIENT
Start: 2018-05-16 | End: 2018-05-16 | Stop reason: HOSPADM

## 2018-05-16 RX ORDER — BUTALBITAL, ACETAMINOPHEN AND CAFFEINE 50; 325; 40 MG/1; MG/1; MG/1
1 TABLET ORAL EVERY 4 HOURS PRN
Qty: 30 TABLET | Refills: 0 | Status: SHIPPED | OUTPATIENT
Start: 2018-05-16 | End: 2018-06-15

## 2018-05-16 RX ORDER — KETOROLAC TROMETHAMINE 30 MG/ML
30 INJECTION, SOLUTION INTRAMUSCULAR; INTRAVENOUS
Status: COMPLETED | OUTPATIENT
Start: 2018-05-16 | End: 2018-05-16

## 2018-05-16 RX ADMIN — PROMETHAZINE HYDROCHLORIDE 25 MG: 25 INJECTION INTRAMUSCULAR; INTRAVENOUS at 05:05

## 2018-05-16 RX ADMIN — KETOROLAC TROMETHAMINE 30 MG: 30 INJECTION, SOLUTION INTRAMUSCULAR; INTRAVENOUS at 05:05

## 2018-05-16 NOTE — ED PROVIDER NOTES
"SCRIBE #1 NOTE: I, Cathy Adelina, am scribing for, and in the presence of, Kwadwo Huang MD. I have scribed the entire note.      History      Chief Complaint   Patient presents with    Insomnia     states hasn't slept in 24 hrss  also c/o headache       Review of patient's allergies indicates:   Allergen Reactions    Sertraline Anxiety     Worsening anxiety, depression, fatigue, malaise.        HPI   HPI    5/16/2018, 4:49 AM   History obtained from the patient      History of Present Illness: Nadya Voss is a 22 y.o. female patient who presents to the Emergency Department for HA which onset gradually 3 days ago. Symptoms are constant and moderate in severity. The patient describes the sxs as "burning fire" located to forehead. Patient rates the pain 7/10 in severity. No mitigating factors reported. Sxs exacerbated with loud sounds. Associated sxs include difficulty sleeping secondary to the pain keeping her awake. No tx PTA. Patient denies any fever, chills, head injury, dizziness, neck pain/stiffness, photophobia, extremity weakness/numbness, N/V, and all other sxs at this time. Patient states her LMP was last week. No further complaints or concerns at this time.     Arrival mode: Personal vehicle     PCP: Cooper Andrews MD       Past Medical History:  Past Medical History:   Diagnosis Date    Anxiety 9/21/2017    Headache        Past Surgical History:  History reviewed. No pertinent surgical history.      Family History:  History reviewed. No pertinent family history.    Social History:  Social History     Social History Main Topics    Smoking status: Never Smoker    Smokeless tobacco: Never Used    Alcohol use Yes      Comment: rare    Drug use: No    Sexual activity: Yes     Partners: Male       ROS   Review of Systems   Constitutional: Negative for chills and fever.   HENT: Negative for sore throat.    Eyes: Negative for photophobia and visual disturbance.   Respiratory: Negative for shortness " of breath.    Cardiovascular: Negative for chest pain.   Gastrointestinal: Negative for nausea.   Genitourinary: Negative for dysuria.   Musculoskeletal: Negative for back pain, neck pain and neck stiffness.   Skin: Negative for rash.   Neurological: Positive for headaches. Negative for dizziness, weakness and numbness.   Hematological: Does not bruise/bleed easily.   Psychiatric/Behavioral: Positive for sleep disturbance (difficulty sleeping).   All other systems reviewed and are negative.      Physical Exam      Initial Vitals [05/16/18 0433]   BP Pulse Resp Temp SpO2   116/86 81 18 98 °F (36.7 °C) 98 %      MAP       96          Physical Exam  Nursing Notes and Vital Signs Reviewed.  Constitutional: Patient is in no acute distress. Well-developed and well-nourished.  Head: Atraumatic. Normocephalic.  Eyes: PERRL. EOM intact. Conjunctivae are not pale. No scleral icterus.  ENT: Mucous membranes are moist. Oropharynx is clear and symmetric.    Neck: Supple. Full ROM. No lymphadenopathy.  Cardiovascular: Regular rate. Regular rhythm. No murmurs, rubs, or gallops. Distal pulses are 2+ and symmetric.  Pulmonary/Chest: No respiratory distress. Clear to auscultation bilaterally. No wheezing or rales.  Abdominal: Soft and non-distended.  There is no tenderness.  No rebound, guarding, or rigidity. Good bowel sounds.  Genitourinary: No CVA tenderness  Musculoskeletal: Moves all extremities. No obvious deformities. No edema. No calf tenderness.  Skin: Warm and dry.  Neurological: Patient is alert and oriented to person, place and time. Pupils ERRL and EOM normal. Cranial nerves II-XII are intact. Strength is full bilaterally; it is equal and 5/5 in bilateral upper and lower extremities. Light touch sense is intact. Speech is clear and normal. No acute focal neurological deficits noted.  Psychiatric: Normal affect. Good eye contact. Appropriate in content.    ED Course    Procedures  ED Vital Signs:  Vitals:    05/16/18 0433  "05/16/18 0550   BP: 116/86 112/78   Pulse: 81 76   Resp: 18 18   Temp: 98 °F (36.7 °C) 98.1 °F (36.7 °C)   SpO2: 98% 99%   Weight: 77.6 kg (171 lb)    Height: 5' 7" (1.702 m)               The Emergency Provider reviewed the vital signs and test results, which are outlined above.    ED Discussion     5:50 AM: Reassessed pt at this time.  Pt states her condition has improved at this time. Discussed with pt all pertinent ED information and results. Discussed pt dx and plan of tx. Gave pt all f/u and return to the ED instructions. All questions and concerns were addressed at this time. Pt expresses understanding of information and instructions, and is comfortable with plan to discharge. Pt is stable for discharge.    Patient's headache is either consistent with previous headache and/or lacks features concerning for emergent or life threatening condition.  I do not suspect SAH, meningitis, increased IC pressure, infectious, toxic, vascular, CNS, or other EMC.  I have discussed this at length with patient and/or family/caretaker.    I discussed with patient and/or family/caretaker that evaluation in the ED does not suggest any emergent or life threatening medical conditions requiring immediate intervention beyond what was provided in the ED, and I believe patient is safe for discharge.  Regardless, an unremarkable evaluation in the ED does not preclude the development or presence of a serious of life threatening condition. As such, patient was instructed to return immediately for any worsening or change in current symptoms.      ED Medication(s):  Medications   ketorolac injection 30 mg (30 mg Intramuscular Given 5/16/18 0511)   promethazine injection 25 mg (25 mg Intramuscular Given 5/16/18 0502)   promethazine injection 25 mg (25 mg Intramuscular Given 5/16/18 0512)       Discharge Medication List as of 5/16/2018  5:50 AM          Follow-up Information     Norma Garza MD In 2 days.    Specialty:  Family " Medicine  Contact information:  [address]  [facility] [address]  [phone]             [facility] - .    Specialty:  Emergency Medicine  Why:  As needed, If symptoms worsen  Contact information:  [address] [facility] [address] [phone]                   Medical Decision Making              Scribe Attestation:   Scribe #1: I performed the above scribed service and the documentation accurately describes the services I performed. I attest to the accuracy of the note.    Attending:   Physician Attestation Statement for Scribe #1: I, Kwadwo Huang MD, personally performed the services described in this documentation, as scribed by Cathy Sahu, in my presence, and it is both accurate and complete.          Clinical Impression       ICD-10-CM ICD-9-CM   1. Nonintractable headache, unspecified chronicity pattern, unspecified headache type R51 784.0       Disposition:   Disposition: Discharged  Condition: Stable         Kwadwo Huang MD  05/17/18 0597

## 2018-05-20 PROBLEM — R19.7 DIARRHEA: Status: RESOLVED | Noted: 2017-07-26 | Resolved: 2018-05-20

## 2018-05-20 NOTE — PROGRESS NOTES
Nadya Voss 22 y.o.  presents as new pt c/o mood swings with cycles, heavy bleeding w/ clots; symptoms for past 2months. Reports lightheadedness for first 2 d of cycle, cycles lasting 5d. Here w/ older sister. Was treated for anxiety past year but reports due to new job & getting . Not on meds at this time. Has questions about fibroids    Past Medical History:   Diagnosis Date    Anxiety 2017    Headache        History reviewed. No pertinent surgical history.    History reviewed. No pertinent family history.    Social History     Social History    Marital status: Single     Spouse name: N/A    Number of children: N/A    Years of education: N/A     Social History Main Topics    Smoking status: Never Smoker    Smokeless tobacco: Never Used    Alcohol use Yes      Comment: rare    Drug use: No    Sexual activity: Yes     Partners: Male     Other Topics Concern    None     Social History Narrative    None       Current Outpatient Prescriptions   Medication Sig Dispense Refill    butalbital-acetaminophen-caffeine -40 mg (FIORICET, ESGIC) -40 mg per tablet Take 1 tablet by mouth every 4 (four) hours as needed for Pain. 30 tablet 0     No current facility-administered medications for this visit.      Ob history: negative     Gyn history: normal pap 2017    Review of patient's allergies indicates:   Allergen Reactions    Sertraline Anxiety     Worsening anxiety, depression, fatigue, malaise.     Vitals:    18 1054   BP: 102/60   Weight: 79 kg (174 lb 2.6 oz)     PE:  GENERAL: NAD, A&O  CHEST: normal effort  ABDOMEN: soft, NT/ND  : normal external genitalia/bimanual exam  EXT: benign    A/P  1. Hypermenorrhea  2. Cbc  3. Birth control options discussed

## 2022-01-27 NOTE — TELEPHONE ENCOUNTER
----- Message from Sugey Jang sent at 7/27/2017  8:21 AM CDT -----  Contact: pt   Call pt regarding questions about side affect on med. Pt states that the nurse was suppose to call her back after talking to the doctor. Pt states that she didn't take med last night cause she still feel nausea.    ..[phone] (home)      good balance

## 2023-03-08 NOTE — TELEPHONE ENCOUNTER
Please ask patient to do stool cultures and rtc for labs to be drawn.  Continue sertraline for now - not sure this would be responsible for her diarrhea.  Does she need something called in for nausea?   DISPLAY PLAN FREE TEXT